# Patient Record
Sex: FEMALE | Race: WHITE | NOT HISPANIC OR LATINO | Employment: OTHER | URBAN - METROPOLITAN AREA
[De-identification: names, ages, dates, MRNs, and addresses within clinical notes are randomized per-mention and may not be internally consistent; named-entity substitution may affect disease eponyms.]

---

## 2023-08-25 PROBLEM — I95.1 ORTHOSTATIC HYPOTENSION: Status: ACTIVE | Noted: 2023-08-25

## 2023-08-25 PROBLEM — R07.89 ATYPICAL CHEST PAIN: Status: ACTIVE | Noted: 2023-08-25

## 2023-08-25 PROBLEM — I25.5 ISCHEMIC CARDIOMYOPATHY: Status: ACTIVE | Noted: 2023-08-25

## 2023-08-25 PROBLEM — E03.9 HYPOTHYROIDISM: Status: ACTIVE | Noted: 2023-08-25

## 2023-08-25 PROBLEM — I47.20 PAROXYSMAL VENTRICULAR TACHYCARDIA: Status: ACTIVE | Noted: 2023-08-25

## 2023-08-25 PROBLEM — I48.0 PAROXYSMAL ATRIAL FIBRILLATION (MULTI): Status: ACTIVE | Noted: 2023-08-25

## 2023-08-25 PROBLEM — R53.1 WEAKNESS: Status: ACTIVE | Noted: 2023-08-25

## 2023-08-25 PROBLEM — I47.29 PAROXYSMAL VENTRICULAR TACHYCARDIA (MULTI): Status: ACTIVE | Noted: 2023-08-25

## 2023-08-25 PROBLEM — R94.39 ABNORMAL NUCLEAR STRESS TEST: Status: ACTIVE | Noted: 2023-08-25

## 2023-08-25 PROBLEM — Z98.62 STATUS POST ANGIOPLASTY: Status: ACTIVE | Noted: 2023-08-25

## 2023-08-25 PROBLEM — I50.22 CHRONIC SYSTOLIC CHF (CONGESTIVE HEART FAILURE) (MULTI): Status: ACTIVE | Noted: 2023-08-25

## 2023-08-25 PROBLEM — E78.5 HYPERLIPIDEMIA: Status: ACTIVE | Noted: 2023-08-25

## 2023-08-25 PROBLEM — I44.7 LBBB (LEFT BUNDLE BRANCH BLOCK): Status: ACTIVE | Noted: 2023-08-25

## 2023-08-25 PROBLEM — Z79.899 HIGH RISK MEDICATION USE: Status: ACTIVE | Noted: 2023-08-25

## 2023-08-25 PROBLEM — E11.9 DIABETES MELLITUS (MULTI): Status: ACTIVE | Noted: 2023-08-25

## 2023-08-25 PROBLEM — I25.10 ASCVD (ARTERIOSCLEROTIC CARDIOVASCULAR DISEASE): Status: ACTIVE | Noted: 2023-08-25

## 2023-08-25 PROBLEM — Z95.810 BIVENTRICULAR ICD (IMPLANTABLE CARDIOVERTER-DEFIBRILLATOR) IN PLACE: Status: ACTIVE | Noted: 2023-08-25

## 2023-08-25 RX ORDER — INSULIN ASPART 100 [IU]/ML
INJECTION, SOLUTION INTRAVENOUS; SUBCUTANEOUS
COMMUNITY
End: 2023-10-04 | Stop reason: ALTCHOICE

## 2023-08-25 RX ORDER — INSULIN DETEMIR 100 [IU]/ML
INJECTION, SOLUTION SUBCUTANEOUS NIGHTLY
COMMUNITY

## 2023-08-25 RX ORDER — NAPROXEN SODIUM 220 MG/1
1 TABLET, FILM COATED ORAL DAILY
COMMUNITY
End: 2024-04-16 | Stop reason: SDUPTHER

## 2023-08-25 RX ORDER — MECLIZINE HCL 12.5 MG 12.5 MG/1
TABLET ORAL
COMMUNITY

## 2023-08-25 RX ORDER — LISINOPRIL 5 MG/1
1 TABLET ORAL DAILY
COMMUNITY
End: 2023-10-04 | Stop reason: ALTCHOICE

## 2023-08-25 RX ORDER — SPIRONOLACTONE 25 MG/1
1 TABLET ORAL DAILY
COMMUNITY
End: 2023-10-04 | Stop reason: ALTCHOICE

## 2023-08-25 RX ORDER — MIDODRINE HYDROCHLORIDE 2.5 MG/1
10 TABLET ORAL 3 TIMES DAILY
COMMUNITY
End: 2023-10-04 | Stop reason: ALTCHOICE

## 2023-08-25 RX ORDER — SERTRALINE HYDROCHLORIDE 50 MG/1
TABLET, FILM COATED ORAL
COMMUNITY
Start: 2023-04-14 | End: 2023-10-04 | Stop reason: ALTCHOICE

## 2023-08-25 RX ORDER — FAMOTIDINE 40 MG/1
40 TABLET, FILM COATED ORAL NIGHTLY
COMMUNITY
End: 2023-10-04 | Stop reason: ALTCHOICE

## 2023-08-25 RX ORDER — PANTOPRAZOLE SODIUM 40 MG/1
TABLET, DELAYED RELEASE ORAL
COMMUNITY

## 2023-08-25 RX ORDER — INSULIN LISPRO 100 [IU]/ML
INJECTION, SOLUTION INTRAVENOUS; SUBCUTANEOUS
COMMUNITY

## 2023-08-25 RX ORDER — METOCLOPRAMIDE 5 MG/1
1 TABLET ORAL 4 TIMES DAILY
COMMUNITY

## 2023-08-25 RX ORDER — OXYBUTYNIN CHLORIDE 5 MG/1
TABLET, EXTENDED RELEASE ORAL
COMMUNITY
Start: 2023-04-10 | End: 2023-10-04 | Stop reason: ALTCHOICE

## 2023-08-25 RX ORDER — NITROGLYCERIN 0.4 MG/1
TABLET SUBLINGUAL
COMMUNITY

## 2023-08-25 RX ORDER — LEVOTHYROXINE SODIUM 125 UG/1
1 TABLET ORAL DAILY
COMMUNITY
Start: 2022-08-26 | End: 2023-10-04 | Stop reason: ALTCHOICE

## 2023-08-25 RX ORDER — TRAZODONE HYDROCHLORIDE 50 MG/1
TABLET ORAL
COMMUNITY
Start: 2023-04-15 | End: 2024-03-22

## 2023-08-25 RX ORDER — ACETAMINOPHEN, DIPHENHYDRAMINE HCL, PHENYLEPHRINE HCL 325; 25; 5 MG/1; MG/1; MG/1
1 TABLET ORAL NIGHTLY
COMMUNITY
End: 2023-10-04 | Stop reason: ALTCHOICE

## 2023-08-25 RX ORDER — LANOLIN ALCOHOL/MO/W.PET/CERES
1 CREAM (GRAM) TOPICAL DAILY
COMMUNITY

## 2023-08-25 RX ORDER — LEVOTHYROXINE SODIUM 100 UG/1
1 TABLET ORAL DAILY
COMMUNITY
End: 2023-10-04 | Stop reason: ALTCHOICE

## 2023-08-25 RX ORDER — ASPIRIN 81 MG/1
1 TABLET ORAL DAILY
COMMUNITY

## 2023-08-25 RX ORDER — QUETIAPINE FUMARATE 25 MG/1
25 TABLET, FILM COATED ORAL NIGHTLY
COMMUNITY
End: 2023-10-04 | Stop reason: ALTCHOICE

## 2023-08-25 RX ORDER — ATORVASTATIN CALCIUM 20 MG/1
1 TABLET, FILM COATED ORAL NIGHTLY
COMMUNITY
Start: 2021-01-19 | End: 2023-12-05 | Stop reason: SDUPTHER

## 2023-08-25 RX ORDER — DOXYCYCLINE 100 MG/1
CAPSULE ORAL
COMMUNITY
Start: 2023-04-22 | End: 2023-10-04 | Stop reason: ALTCHOICE

## 2023-08-25 RX ORDER — METOPROLOL SUCCINATE 25 MG/1
25 TABLET, EXTENDED RELEASE ORAL DAILY
COMMUNITY
End: 2024-03-22

## 2023-08-25 RX ORDER — AMIODARONE HYDROCHLORIDE 200 MG/1
1 TABLET ORAL DAILY
COMMUNITY
End: 2023-11-20

## 2023-08-25 RX ORDER — INSULIN GLARGINE 100 [IU]/ML
INJECTION, SOLUTION SUBCUTANEOUS
COMMUNITY

## 2023-10-04 ENCOUNTER — OFFICE VISIT (OUTPATIENT)
Dept: CARDIOLOGY | Facility: CLINIC | Age: 83
End: 2023-10-04
Payer: MEDICARE

## 2023-10-04 VITALS
DIASTOLIC BLOOD PRESSURE: 68 MMHG | HEART RATE: 127 BPM | BODY MASS INDEX: 21.16 KG/M2 | SYSTOLIC BLOOD PRESSURE: 136 MMHG | HEIGHT: 65 IN | WEIGHT: 127 LBS

## 2023-10-04 DIAGNOSIS — I48.91 ATRIAL FIBRILLATION, UNSPECIFIED TYPE (MULTI): Primary | ICD-10-CM

## 2023-10-04 DIAGNOSIS — Z95.810 BIVENTRICULAR ICD (IMPLANTABLE CARDIOVERTER-DEFIBRILLATOR) IN PLACE: ICD-10-CM

## 2023-10-04 DIAGNOSIS — I25.5 ISCHEMIC CARDIOMYOPATHY: ICD-10-CM

## 2023-10-04 DIAGNOSIS — I50.22 CHRONIC SYSTOLIC CHF (CONGESTIVE HEART FAILURE) (MULTI): ICD-10-CM

## 2023-10-04 DIAGNOSIS — I95.1 ORTHOSTATIC HYPOTENSION: ICD-10-CM

## 2023-10-04 DIAGNOSIS — Z95.0 PACEMAKER: ICD-10-CM

## 2023-10-04 DIAGNOSIS — R55 SYNCOPE AND COLLAPSE: ICD-10-CM

## 2023-10-04 DIAGNOSIS — I47.29 PAROXYSMAL VENTRICULAR TACHYCARDIA (MULTI): ICD-10-CM

## 2023-10-04 DIAGNOSIS — Z79.899 HIGH RISK MEDICATION USE: ICD-10-CM

## 2023-10-04 DIAGNOSIS — R94.31 ABNORMAL EKG: ICD-10-CM

## 2023-10-04 PROCEDURE — 1159F MED LIST DOCD IN RCRD: CPT | Performed by: INTERNAL MEDICINE

## 2023-10-04 PROCEDURE — 93000 ELECTROCARDIOGRAM COMPLETE: CPT | Performed by: INTERNAL MEDICINE

## 2023-10-04 PROCEDURE — 99214 OFFICE O/P EST MOD 30 MIN: CPT | Performed by: INTERNAL MEDICINE

## 2023-10-04 PROCEDURE — 1036F TOBACCO NON-USER: CPT | Performed by: INTERNAL MEDICINE

## 2023-10-04 RX ORDER — LEVOTHYROXINE SODIUM 150 UG/1
150 TABLET ORAL
COMMUNITY
End: 2024-03-22

## 2023-10-04 RX ORDER — FLUDROCORTISONE ACETATE 0.1 MG/1
0.1 TABLET ORAL ONCE
Status: SHIPPED | OUTPATIENT
Start: 2023-10-04

## 2023-10-04 RX ORDER — SERTRALINE HYDROCHLORIDE 100 MG/1
100 TABLET, FILM COATED ORAL DAILY
COMMUNITY

## 2023-10-04 RX ORDER — MIDODRINE HYDROCHLORIDE 10 MG/1
10 TABLET ORAL 3 TIMES DAILY
COMMUNITY
End: 2024-03-22 | Stop reason: DRUGHIGH

## 2023-10-04 NOTE — PROGRESS NOTES
CARDIOLOGY OFFICE VISIT      CHIEF COMPLAINT  Chief Complaint   Patient presents with    Atrial Fibrillation     Patient presented today for a 1 year follow up.         HISTORY OF PRESENT ILLNESS  HPI     83-year-old  female who is followed for ischemic cardiomyopathy with a left ventricular ejection fraction of 30 to 35% per echo guided AV optimization dated November 26, 2019, New York Heart Association class II-III, stage C heart failure, coronary artery disease status post angioplasty with drug-eluting stent to the posterior descending artery and OMB on April 13, 2010 and failed attempts at angioplasty of the proximal LAD due to a small perforation on August 21, 2019. She underwent implantation of an AV biventricular ICD in November 16, 2005 with the most recent generator change on November 9, 2021 for KADEN parameters.     Since the last office visit, she has been doing well.  She denies any symptoms of chest pain or shortness of breath or palpitations.    Patient still using amiodarone 200 mg daily.  Patient states that she continues having episodes of postural dizziness.  No syncope.    PFTs in July 2023 DLCO reduced.  TSH in July 2023 5.37.    Patient had a device interrogation in August 2023 that shows battery longevity 2 years.  Otherwise adequate biventricular device system.    EKG performed today shows atrial and ventricular paced rhythm at rate of 127 bpm.  QRS duration 142 ms.  QT corrected 357 ms.  Rhythm strip shows the same pattern.            Past Medical History  Past Medical History:   Diagnosis Date    Essential (primary) hypertension 08/10/2022    Essential hypertension, benign    Personal history of other diseases of the circulatory system     History of hypotension       Social History  Social History     Tobacco Use    Smoking status: Never    Smokeless tobacco: Never   Substance Use Topics    Alcohol use: Not Currently    Drug use: Never       Family History     Family History    Problem Relation Name Age of Onset    Diabetes Mother          mellitus    Heart attack Father          Allergies:  Allergies   Allergen Reactions    Beta-Blockers (Beta-Adrenergic Blocking Agts) Other     Adverse reaction: Hypotension    Penicillins Hives    Sulfa (Sulfonamide Antibiotics) Nausea Only and Other        Outpatient Medications:  Current Outpatient Medications   Medication Instructions    amiodarone (Pacerone) 200 mg tablet 1 tablet, oral, Daily    aspirin 81 mg chewable tablet 1 tablet, oral, Daily    aspirin 81 mg EC tablet 1 tablet, oral, Daily    atorvastatin (Lipitor) 20 mg tablet 1 tablet, oral, Nightly    calcium polycarbophiL (FIBERCON) 625 mg, oral, Daily    cyanocobalamin (Vitamin B-12) 1,000 mcg tablet 1 tablet, oral, Daily    insulin detemir (Levemir FlexPen) 100 unit/mL (3 mL) pen subcutaneous, Nightly    insulin glargine (Lantus Solostar U-100 Insulin) 100 unit/mL (3 mL) pen subcutaneous    insulin lispro (HumaLOG KwikPen Insulin) 100 unit/mL injection subcutaneous    levothyroxine (SYNTHROID, LEVOXYL) 150 mcg, oral, Daily before breakfast    linaCLOtide (Linzess) 290 mcg capsule 1 capsule, oral, Daily    meclizine (Antivert) 12.5 mg tablet oral    metoclopramide (Reglan) 5 mg tablet 1 tablet, oral, 4 times daily    metoprolol succinate XL (TOPROL-XL) 25 mg, oral, Daily    midodrine (PROAMATINE) 10 mg, oral, 3 times daily    nitroglycerin (Nitrostat) 0.4 mg SL tablet sublingual    pantoprazole (ProtoNix) 40 mg EC tablet oral    sertraline (ZOLOFT) 100 mg, oral, Daily    traZODone (Desyrel) 50 mg tablet           REVIEW OF SYSTEMS  ROS      VITALS  Vitals:    10/04/23 1437   BP: 136/68   Pulse: (!) 127       PHYSICAL EXAM  Physical Exam    PECOMP    PHYSICAL EXAMINATION:  GENERAL APPEARANCE: Well developed, well nourished, in no acute distress.  CHEST: Symmetric and non-tender.  INTEGUMENT: Skin warm and dry, without gross excoriationis or lesions.  HEENT: No gross abnormalities of  conjunctiva, teeth, gums, oral mucosa  NECK: Supple, no JVD, no bruit. Thyroid not palpable. Carotid upstrokes normal.  NEURO/PSHCY: Alert and oriented x3; appropriate behavior and responses and responses, grossly normal cerebellar function with normal balance and coordination  LUNGS: Clear to auscultation bilaterally; normal respiratory effort.  HEART: Rate and rhythm regular with no evident murmur; no gallop appreciated. There are no rubs, clicks or heaves. PMI nondisplaced.  Device in the left pectoral healing well.  No signs of hematoma or infection.  ABDOMEN: Soft, nontender, no palpable hepatosplenomegaly, no mases, no bruits. Abdominal aorta not noted to be enlarged.  MUSCULOSKELETAL: Ambulatory with normal tandem gait.  EXTREMITIES: Warm with good color, no clubbing or cyanois. There is no edema noted.  PERIPHERAL VASCULAR: Pulses present and equally palpable; 2+ throughout. No femoral bruits.    ASSESSMENT AND PLAN  Clinical impressions:  1. Ischemic cardiomyopathy with a left ventricular ejection fraction once depressed to 20%, improved to 30 to 35% per echo guided AV optimization dated November 26, 2019, New York Heart Association class II, stage C heart failure at today's office visit.  2. Coronary artery disease status post angioplasty with drug-eluting stent to the PDA and OMB on April 13, 2010 and failed attempt at PTCA of the proximal LAD due to a small perforation per left heart catheterization dated August 21, 2019.  3. AV biventricular ICD implant on November 16, 2005 with the most recent generator change out on November 9, 2021 for KADEN parameters ([place] unify Assura).  4. Riotta lead on field alert and functioning normally.  5. Ventricular tachycardia controlled on amiodarone..  6. Hypotension on midodrine.  7. Dyslipidemia on statin.  8. Diabetes mellitus.  9. Hypothyroidism on replacement therapy.  10. Anxiety disorder.  11. BMI of 22.31.      Plan recommendations    Patient continues  having episodes of postural dizziness-hypotension.  We will prescribe Florinef 0.1 mg 1 tablet daily.    Continue with midodrine therapy.    Follow device clinic as scheduled.    Continue with high risk medication (amiodarone).    Get CMP and TSH every 6 months for heart risk medication.    Get complete PFTs annually for high risk medication.    Follow my office every 6 months or sooner needed.    Risk factor modification and lifestyle modification discussed with patient. Diet , exercise and hydration discussed with patient.    I have personally review with patient during this office visit, laboratory data, echocardiogram results, stress test results, Holter-event monitor results prior and after the last electrophysiology visit. All questions has been answered.    Please excuse any errors in grammar or translation related to this dictation.  Voice recognition software was utilized to prepare this document.

## 2023-10-05 ENCOUNTER — DOCUMENTATION (OUTPATIENT)
Dept: CARDIOLOGY | Facility: CLINIC | Age: 83
End: 2023-10-05
Payer: MEDICARE

## 2023-10-05 NOTE — PROGRESS NOTES
10/5/23  Pt's granddaughter left vm new order for Imaninef did not go to pharmacy yesterday from Dr. Garcia.  Re-sent pending.  Ivana

## 2023-10-05 NOTE — PROGRESS NOTES
10/5/23  Pt's granddaughter left vm Fludrocortisone was not sent to pharmacy at  with Dr. Garcia yesterday.  Per chart review, this was recorded accidentally as a one time, in clinic administered dose.  Reviewed note with Joann in suite in Dr. Garcia's absence.  OK to call in verbal order.  Spoke with Sarai at pharmacy to give order.  She read back correct order to confirm.  Ivana

## 2023-11-19 DIAGNOSIS — I48.0 PAROXYSMAL ATRIAL FIBRILLATION (MULTI): ICD-10-CM

## 2023-11-20 RX ORDER — AMIODARONE HYDROCHLORIDE 200 MG/1
200 TABLET ORAL DAILY
Qty: 90 TABLET | Refills: 3 | Status: SHIPPED | OUTPATIENT
Start: 2023-11-20

## 2023-12-05 ENCOUNTER — TELEPHONE (OUTPATIENT)
Dept: CARDIOLOGY | Facility: CLINIC | Age: 83
End: 2023-12-05
Payer: MEDICARE

## 2023-12-05 DIAGNOSIS — E78.2 MIXED HYPERLIPIDEMIA: Primary | ICD-10-CM

## 2023-12-05 RX ORDER — ATORVASTATIN CALCIUM 20 MG/1
20 TABLET, FILM COATED ORAL NIGHTLY
Qty: 90 TABLET | Refills: 3 | Status: SHIPPED | OUTPATIENT
Start: 2023-12-05

## 2023-12-05 NOTE — TELEPHONE ENCOUNTER
Pharmacy requesting refill of atorvastatin 20mg daily. Pt has a follow up scheduled. Order sent to physician for signature.   
No

## 2023-12-28 ENCOUNTER — TELEPHONE (OUTPATIENT)
Dept: CARDIOLOGY | Facility: CLINIC | Age: 83
End: 2023-12-28
Payer: MEDICARE

## 2023-12-28 DIAGNOSIS — Z79.899 HIGH RISK MEDICATION USE: ICD-10-CM

## 2023-12-28 DIAGNOSIS — I48.0 PAROXYSMAL ATRIAL FIBRILLATION (MULTI): ICD-10-CM

## 2023-12-28 NOTE — TELEPHONE ENCOUNTER
Amiodarone testing orders sent to [facility] [place] in 2/1/2024 @ 12 pm.  PFT paper order also completed.

## 2023-12-28 NOTE — LETTER
Ricarda Galeas     [address] [address]         Dear Ricarda Galeas     Enclosed you will find an order form to have your Pulmonary Functions Test done at [facility]. The specific time and date of your testing is indicated on the form.  It is also necessary for you to have a chest x-ray as well as lab work.  These tests are needed if you are on one of the following medications:  Cordarone, Pacerone, or Amiodarone.      ([place] [facility] [place] through the  [facility] entrance - NOT the Emergency Room entrance. )    If you are unable to keep the appointment that has been made for you, please contact our office at [phone] and press option #3 so that we may assist you in rescheduling.    Thank you for your compliance with this testing,      [facility]  [address] [address]  Phone: [phone]  Fax: [phone]    Note:  You MAY NOT USE inhalers for 4 hours PRIOR to your Pulmonary Function Test    [facility] [place]1/2024 at 12 pm

## 2024-03-21 ENCOUNTER — APPOINTMENT (OUTPATIENT)
Dept: CARDIOLOGY | Facility: CLINIC | Age: 84
End: 2024-03-21
Payer: MEDICARE

## 2024-03-22 ENCOUNTER — OFFICE VISIT (OUTPATIENT)
Dept: CARDIOLOGY | Facility: CLINIC | Age: 84
End: 2024-03-22
Payer: MEDICARE

## 2024-03-22 VITALS
BODY MASS INDEX: 21.39 KG/M2 | HEIGHT: 64 IN | HEART RATE: 70 BPM | DIASTOLIC BLOOD PRESSURE: 72 MMHG | WEIGHT: 125.3 LBS | SYSTOLIC BLOOD PRESSURE: 140 MMHG

## 2024-03-22 DIAGNOSIS — Z79.899 HIGH RISK MEDICATION USE: ICD-10-CM

## 2024-03-22 DIAGNOSIS — E11.9 INSULIN-REQUIRING OR DEPENDENT TYPE II DIABETES MELLITUS (MULTI): ICD-10-CM

## 2024-03-22 DIAGNOSIS — I25.5 CARDIOMYOPATHY, ISCHEMIC: ICD-10-CM

## 2024-03-22 DIAGNOSIS — I25.10 ASCVD (ARTERIOSCLEROTIC CARDIOVASCULAR DISEASE): Primary | ICD-10-CM

## 2024-03-22 DIAGNOSIS — I48.0 PAROXYSMAL ATRIAL FIBRILLATION (MULTI): ICD-10-CM

## 2024-03-22 DIAGNOSIS — T82.111A: ICD-10-CM

## 2024-03-22 DIAGNOSIS — I10 ESSENTIAL HYPERTENSION: ICD-10-CM

## 2024-03-22 DIAGNOSIS — Z95.820 STATUS POST ANGIOPLASTY WITH STENT: ICD-10-CM

## 2024-03-22 DIAGNOSIS — I95.1 ORTHOSTATIC HYPOTENSION: ICD-10-CM

## 2024-03-22 DIAGNOSIS — E78.5 HYPERLIPIDEMIA, UNSPECIFIED HYPERLIPIDEMIA TYPE: ICD-10-CM

## 2024-03-22 DIAGNOSIS — Z78.9 NEVER SMOKED TOBACCO: ICD-10-CM

## 2024-03-22 DIAGNOSIS — Z79.4 INSULIN-REQUIRING OR DEPENDENT TYPE II DIABETES MELLITUS (MULTI): ICD-10-CM

## 2024-03-22 DIAGNOSIS — I47.29 PAROXYSMAL VENTRICULAR TACHYCARDIA (MULTI): ICD-10-CM

## 2024-03-22 PROCEDURE — 99214 OFFICE O/P EST MOD 30 MIN: CPT | Performed by: INTERNAL MEDICINE

## 2024-03-22 PROCEDURE — 3078F DIAST BP <80 MM HG: CPT | Performed by: INTERNAL MEDICINE

## 2024-03-22 PROCEDURE — 93000 ELECTROCARDIOGRAM COMPLETE: CPT | Performed by: INTERNAL MEDICINE

## 2024-03-22 PROCEDURE — 1036F TOBACCO NON-USER: CPT | Performed by: INTERNAL MEDICINE

## 2024-03-22 PROCEDURE — 3077F SYST BP >= 140 MM HG: CPT | Performed by: INTERNAL MEDICINE

## 2024-03-22 PROCEDURE — 1159F MED LIST DOCD IN RCRD: CPT | Performed by: INTERNAL MEDICINE

## 2024-03-22 RX ORDER — MIDODRINE HYDROCHLORIDE 5 MG/1
5 TABLET ORAL 3 TIMES DAILY
Qty: 90 TABLET | Refills: 0 | Status: SHIPPED | OUTPATIENT
Start: 2024-03-22 | End: 2024-04-16 | Stop reason: DRUGHIGH

## 2024-03-22 ASSESSMENT — ENCOUNTER SYMPTOMS: PALPITATIONS: 1

## 2024-03-22 NOTE — PATIENT INSTRUCTIONS
Please bring all medicines, vitamins, and herbal supplements with you when you come to the office.    Prescriptions will not be filled unless you are compliant with your follow up appointments or have a follow up appointment scheduled as per instruction of your physician. Refills should be requested at the time of your visit.    Reduce Midodrine to 5 mg one tablet 3 times daily  Lab work/amio lab also  B/p check 2-4 weeks  Follow up 6 months

## 2024-03-22 NOTE — PROGRESS NOTES
Subjective   Ricarda Galeas is a 83 y.o. female       Chief Complaint    Follow-up          HPI   Patient is in the office with her  and her daughter for follow-up for the problems noted below.  She has had no new complaints since her last visit and was seen once by electrophysiology October 2023.  She has chronic fatigue but denies any chest pain syncope or AICD discharge.  She has had no significant fluctuations in her weight.  Last visit I increased the midodrine up to 10 mg 3 times daily due to significant hypotension, currently she is hypertensive and has not been checking her blood pressure at home.  She has had no lab data since her last visit.  She had PFT and chest x-ray few weeks ago which I reviewed with her.  She seem to have no side effect of current medications    Assessment/recommendations:     1-coronary artery disease status post multivessel angioplasty in the past. Currently angina free and on maximally tolerated medical therapy. Attempted PCI of totally occluded LAD in 2019 failed  2-hyperlipidemia on statin therapy. Lipid profile is due and was ordered.  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  Presently she is hypertensive on maximal dose midodrine.  The dose will be reduced down to 5 mg 3 times daily and she will be back for blood pressure check.  5-History of hypothyroidism on replacement therapy.  Lab data were ordered  6-“status post biventricular AICD implantation , patient had battery change 2021, patient will continue to follow-up with electrophysiology Dr. Garcia at [facility]  7-ischemic cardiomyopathy. chronic systolic heart failure. She is stage C functional class II. Patient has not been able to tolerate standard heart failure therapy especially vasodilators due to hypotension caused by diabetic peripheral neuropathy. Last ejection fraction 30-35% 2019 echo  8-generalized fatigue and lack of stamina due to  "multiple factors including systolic heart failure   9-high risk medication with amiodarone . Amiodarone testing have been inconsequential,  10-paroxysmal ventricular tachycardia with no recurrences on amiodarone     Review of Systems   Cardiovascular:  Positive for palpitations.   All other systems reviewed and are negative.           Vitals:    03/22/24 0829   BP: 140/72   BP Location: Left arm   Patient Position: Sitting   Pulse: 70   Weight: 56.8 kg (125 lb 4.8 oz)   Height: 1.626 m (5' 4\")        Objective   Physical Exam  Constitutional:       Appearance: Normal appearance.   HENT:      Nose: Nose normal.   Neck:      Vascular: No carotid bruit.   Cardiovascular:      Rate and Rhythm: Normal rate.      Pulses: Normal pulses.      Heart sounds: Normal heart sounds.   Pulmonary:      Effort: Pulmonary effort is normal.   Abdominal:      General: Bowel sounds are normal.      Palpations: Abdomen is soft.   Musculoskeletal:         General: Normal range of motion.      Cervical back: Normal range of motion.      Right lower leg: No edema.      Left lower leg: No edema.   Skin:     General: Skin is warm and dry.   Neurological:      General: No focal deficit present.      Mental Status: She is alert.   Psychiatric:         Mood and Affect: Mood normal.         Behavior: Behavior normal.         Thought Content: Thought content normal.         Judgment: Judgment normal.         Allergies  Beta-blockers (beta-adrenergic blocking agts), Penicillins, and Sulfa (sulfonamide antibiotics)     Current Medications    Current Outpatient Medications:     amiodarone (Pacerone) 200 mg tablet, TAKE 1 TABLET BY MOUTH DAILY, Disp: 90 tablet, Rfl: 3    aspirin 81 mg chewable tablet, Chew 1 tablet (81 mg) once daily., Disp: , Rfl:     aspirin 81 mg EC tablet, Take 1 tablet (81 mg) by mouth once daily., Disp: , Rfl:     atorvastatin (Lipitor) 20 mg tablet, Take 1 tablet (20 mg) by mouth once daily at bedtime., Disp: 90 tablet, Rfl: " 3    calcium polycarbophiL (Fibercon) 625 mg tablet, Take 1 tablet (625 mg) by mouth once daily., Disp: , Rfl:     cyanocobalamin (Vitamin B-12) 1,000 mcg tablet, Take 1 tablet (1,000 mcg) by mouth once daily., Disp: , Rfl:     insulin detemir (Levemir FlexPen) 100 unit/mL (3 mL) pen, Inject under the skin once daily at bedtime., Disp: , Rfl:     insulin glargine (Lantus Solostar U-100 Insulin) 100 unit/mL (3 mL) pen, Inject under the skin., Disp: , Rfl:     insulin lispro (HumaLOG KwikPen Insulin) 100 unit/mL injection, Inject under the skin., Disp: , Rfl:     meclizine (Antivert) 12.5 mg tablet, Take by mouth., Disp: , Rfl:     metoclopramide (Reglan) 5 mg tablet, Take 1 tablet (5 mg) by mouth 4 times a day., Disp: , Rfl:     nitroglycerin (Nitrostat) 0.4 mg SL tablet, Place under the tongue., Disp: , Rfl:     pantoprazole (ProtoNix) 40 mg EC tablet, Take by mouth., Disp: , Rfl:     sertraline (Zoloft) 100 mg tablet, Take 1 tablet (100 mg) by mouth once daily., Disp: , Rfl:     midodrine (Proamatine) 5 mg tablet, Take 1 tablet (5 mg) by mouth 3 times a day., Disp: 90 tablet, Rfl: 0    Current Facility-Administered Medications:     fludrocortisone (Florinef) tablet 0.1 mg, 0.1 mg, oral, Once, Dieudonne Garcia MD                   EKG done in office today   Assessment/Plan   1. ASCVD (arteriosclerotic cardiovascular disease)  Follow Up In Cardiology      2. Essential hypertension  Follow Up In Cardiology      3. Paroxysmal atrial fibrillation (CMS/HCC)  ECG 12 Lead    Thyroid Stimulating Hormone    Thyroxine, Free    Triiodothyronine, Total      4. Paroxysmal ventricular tachycardia (CMS/HCC)  Basic Metabolic Panel    CBC      5. Cardiomyopathy, ischemic  Basic Metabolic Panel    CBC    Thyroid Stimulating Hormone      6. Biventricular AICD generator malfunction        7. High risk medication use  Basic Metabolic Panel    CBC    Thyroid Stimulating Hormone    Thyroxine, Free    Triiodothyronine, Total      8. Status  post angioplasty with stent        9. Orthostatic hypotension  midodrine (Proamatine) 5 mg tablet      10. BMI 21.0-21.9, adult        11. Never smoked tobacco        12. Hyperlipidemia, unspecified hyperlipidemia type  Aspartate Aminotransferase    Alanine Aminotransferase    Lipid Panel      13. Insulin-requiring or dependent type II diabetes mellitus (CMS/[facility])                 Scribe Attestation  By signing my name below, Imary Scribe   attest that this documentation has been prepared under the direction and in the presence of Harjit Gamboa MD.     Provider Attestation - Scribe documentation    All medical record entries made by the Scribe were at my direction and personally dictated by me. I have reviewed the chart and agree that the record accurately reflects my personal performance of the history, physical exam, discussion and plan.

## 2024-04-04 ENCOUNTER — APPOINTMENT (OUTPATIENT)
Dept: CARDIOLOGY | Facility: CLINIC | Age: 84
End: 2024-04-04
Payer: MEDICARE

## 2024-04-16 ENCOUNTER — OFFICE VISIT (OUTPATIENT)
Dept: CARDIOLOGY | Facility: CLINIC | Age: 84
End: 2024-04-16
Payer: MEDICARE

## 2024-04-16 VITALS
HEART RATE: 68 BPM | BODY MASS INDEX: 21.68 KG/M2 | HEIGHT: 64 IN | SYSTOLIC BLOOD PRESSURE: 134 MMHG | DIASTOLIC BLOOD PRESSURE: 62 MMHG | WEIGHT: 127 LBS

## 2024-04-16 DIAGNOSIS — I95.1 ORTHOSTATIC HYPOTENSION: ICD-10-CM

## 2024-04-16 DIAGNOSIS — I10 ESSENTIAL HYPERTENSION: ICD-10-CM

## 2024-04-16 PROCEDURE — 3075F SYST BP GE 130 - 139MM HG: CPT | Performed by: INTERNAL MEDICINE

## 2024-04-16 PROCEDURE — 1159F MED LIST DOCD IN RCRD: CPT | Performed by: INTERNAL MEDICINE

## 2024-04-16 PROCEDURE — 3078F DIAST BP <80 MM HG: CPT | Performed by: INTERNAL MEDICINE

## 2024-04-16 PROCEDURE — 99212 OFFICE O/P EST SF 10 MIN: CPT | Performed by: INTERNAL MEDICINE

## 2024-04-16 PROCEDURE — 1036F TOBACCO NON-USER: CPT | Performed by: INTERNAL MEDICINE

## 2024-04-16 RX ORDER — MIDODRINE HYDROCHLORIDE 2.5 MG/1
2.5 TABLET ORAL 3 TIMES DAILY
Qty: 270 TABLET | Refills: 3 | Status: SHIPPED | OUTPATIENT
Start: 2024-04-16 | End: 2025-04-16

## 2024-04-16 NOTE — PROGRESS NOTES
"Subjective   Ricarda Galeas is a 83 y.o. female       Chief Complaint    Follow-up; Hypertension          HPI   Patient is in the office for follow-up for the adjustment made on her midodrine for hypertension, the dose was reduced last visit down to 5 mg 3 times a day and she remains in the upper normal range for blood pressure reading and given her LV systolic dysfunction we need to reduce the dose down to 2.5 mg 3 times daily.  That is what we did and the patient will follow-up with me as scheduled.  She denies any dizziness or lightheadedness.  ROS         Vitals:    04/16/24 1131 04/16/24 1136   BP: 136/66 134/62   BP Location: Left arm Right arm   Patient Position: Sitting Sitting   Pulse: 68 68   Weight: 57.6 kg (127 lb)    Height: 1.626 m (5' 4\")         Objective   Physical Exam    Allergies  Beta-blockers (beta-adrenergic blocking agts), Penicillins, and Sulfa (sulfonamide antibiotics)     Current Medications    Current Outpatient Medications:     amiodarone (Pacerone) 200 mg tablet, TAKE 1 TABLET BY MOUTH DAILY, Disp: 90 tablet, Rfl: 3    aspirin 81 mg EC tablet, Take 1 tablet (81 mg) by mouth once daily., Disp: , Rfl:     atorvastatin (Lipitor) 20 mg tablet, Take 1 tablet (20 mg) by mouth once daily at bedtime., Disp: 90 tablet, Rfl: 3    calcium polycarbophiL (Fibercon) 625 mg tablet, Take 1 tablet (625 mg) by mouth once daily., Disp: , Rfl:     cyanocobalamin (Vitamin B-12) 1,000 mcg tablet, Take 1 tablet (1,000 mcg) by mouth once daily., Disp: , Rfl:     insulin detemir (Levemir FlexPen) 100 unit/mL (3 mL) pen, Inject under the skin once daily at bedtime., Disp: , Rfl:     insulin glargine (Lantus Solostar U-100 Insulin) 100 unit/mL (3 mL) pen, Inject under the skin., Disp: , Rfl:     insulin lispro (HumaLOG KwikPen Insulin) 100 unit/mL injection, Inject under the skin., Disp: , Rfl:     meclizine (Antivert) 12.5 mg tablet, Take by mouth., Disp: , Rfl:     metoclopramide (Reglan) 5 mg tablet, Take 1 " tablet (5 mg) by mouth 4 times a day., Disp: , Rfl:     nitroglycerin (Nitrostat) 0.4 mg SL tablet, Place under the tongue., Disp: , Rfl:     pantoprazole (ProtoNix) 40 mg EC tablet, Take by mouth., Disp: , Rfl:     sertraline (Zoloft) 100 mg tablet, Take 1 tablet (100 mg) by mouth once daily., Disp: , Rfl:     midodrine (Proamatine) 5 mg tablet, Take 1 tablet (5 mg) by mouth 3 times a day., Disp: 90 tablet, Rfl: 0    Current Facility-Administered Medications:     fludrocortisone (Florinef) tablet 0.1 mg, 0.1 mg, oral, Once, Dieudonne Garcia MD                     Assessment/Plan   1. Essential hypertension  Follow Up In Cardiology               Scribe Attestation  By signing my name below, Christi WALTERS LPN   , [place]   attest that this documentation has been prepared under the direction and in the presence of [facility] RN 1.     Provider Attestation - Scribe documentation    All medical record entries made by the Scribe were at my direction and personally dictated by me. I have reviewed the chart and agree that the record accurately reflects my personal performance of the history, physical exam, discussion and plan.

## 2024-05-16 PROCEDURE — 93284 PRGRMG EVAL IMPLANTABLE DFB: CPT | Performed by: INTERNAL MEDICINE

## 2024-10-01 ENCOUNTER — APPOINTMENT (OUTPATIENT)
Dept: CARDIOLOGY | Facility: CLINIC | Age: 84
End: 2024-10-01
Payer: MEDICARE

## 2024-10-01 VITALS
HEART RATE: 71 BPM | HEIGHT: 64 IN | BODY MASS INDEX: 20.66 KG/M2 | WEIGHT: 121 LBS | SYSTOLIC BLOOD PRESSURE: 120 MMHG | DIASTOLIC BLOOD PRESSURE: 76 MMHG

## 2024-10-01 DIAGNOSIS — I50.22 CHRONIC SYSTOLIC CHF (CONGESTIVE HEART FAILURE): ICD-10-CM

## 2024-10-01 DIAGNOSIS — Z95.810 BIVENTRICULAR ICD (IMPLANTABLE CARDIOVERTER-DEFIBRILLATOR) IN PLACE: ICD-10-CM

## 2024-10-01 DIAGNOSIS — I25.5 ISCHEMIC CARDIOMYOPATHY: ICD-10-CM

## 2024-10-01 DIAGNOSIS — I95.1 ORTHOSTATIC HYPOTENSION: ICD-10-CM

## 2024-10-01 DIAGNOSIS — Z78.9 NEVER SMOKED TOBACCO: ICD-10-CM

## 2024-10-01 DIAGNOSIS — E78.5 HYPERLIPIDEMIA, UNSPECIFIED HYPERLIPIDEMIA TYPE: ICD-10-CM

## 2024-10-01 DIAGNOSIS — Z79.899 HIGH RISK MEDICATION USE: ICD-10-CM

## 2024-10-01 DIAGNOSIS — Z91.81 AT RISK FOR FALLS: ICD-10-CM

## 2024-10-01 DIAGNOSIS — I47.29 PAROXYSMAL VENTRICULAR TACHYCARDIA (MULTI): ICD-10-CM

## 2024-10-01 DIAGNOSIS — I25.10 ASCVD (ARTERIOSCLEROTIC CARDIOVASCULAR DISEASE): Primary | ICD-10-CM

## 2024-10-01 DIAGNOSIS — I48.0 PAROXYSMAL ATRIAL FIBRILLATION (MULTI): ICD-10-CM

## 2024-10-01 PROCEDURE — 1036F TOBACCO NON-USER: CPT | Performed by: INTERNAL MEDICINE

## 2024-10-01 PROCEDURE — 99214 OFFICE O/P EST MOD 30 MIN: CPT | Performed by: INTERNAL MEDICINE

## 2024-10-01 PROCEDURE — 1159F MED LIST DOCD IN RCRD: CPT | Performed by: INTERNAL MEDICINE

## 2024-10-01 PROCEDURE — 93000 ELECTROCARDIOGRAM COMPLETE: CPT | Performed by: INTERNAL MEDICINE

## 2024-10-01 PROCEDURE — 3074F SYST BP LT 130 MM HG: CPT | Performed by: INTERNAL MEDICINE

## 2024-10-01 PROCEDURE — 3078F DIAST BP <80 MM HG: CPT | Performed by: INTERNAL MEDICINE

## 2024-10-01 RX ORDER — TRAZODONE HYDROCHLORIDE 50 MG/1
50 TABLET ORAL NIGHTLY
COMMUNITY

## 2024-10-01 RX ORDER — NITROGLYCERIN 0.4 MG/1
0.4 TABLET SUBLINGUAL EVERY 5 MIN PRN
Qty: 90 TABLET | Refills: 11 | Status: SHIPPED | OUTPATIENT
Start: 2024-10-01

## 2024-10-01 NOTE — PATIENT INSTRUCTIONS
Please bring all medicines, vitamins, and herbal supplements with you when you come to the office.    Prescriptions will not be filled unless you are compliant with your follow up appointments or have a follow up appointment scheduled as per instruction of your physician. Refills should be requested at the time of your visit.     Amio work up Feb  Follow up 6 months

## 2024-10-01 NOTE — PROGRESS NOTES
Subjective   Ricarda Galeas is a 84 y.o. female       Chief Complaint    Follow-up          HPI     Patient is in the office for follow-up accompanied by her daughter.  She lost her  Jimenez couple months ago with intracerebral bleed after a fall.  She denies any chest pain or syncope has tendency to fall and education that regard was provided.  Her AICD device interrogation revealed no arrhythmias.  She is on amiodarone but not anticoagulated.  Amiodarone testing has been in place.  She is very frail and her blood pressure is normal.  We have avoided vasodilators due to hypotension same story with the beta-blockers.  She has a small dose of midodrine to keep her blood pressure from dropping.    Assessment/recommendations:     1-coronary artery disease status post multivessel angioplasty in the past. Currently angina free and on maximally tolerated medical therapy. Attempted PCI of totally occluded LAD in 2019 failed  2-hyperlipidemia on statin therapy. Lipid profile is due and was ordered.  3-type II diabetes managed by PCP, controlled  4-diabetic autonomic dysfunction leading to orthostatic hypotension.  Presently she is on midodrine 2.5 mg 3 times daily with no further drop in the blood pressure  5-History of hypothyroidism on replacement therapy.  Lab data were ordered  6-status post biventricular AICD implantation , patient had battery change 2021, patient will continue to follow-up with electrophysiology Dr. Garcia at [facility]  7- ischemic cardiomyopathy with chronic systolic heart failure. She is stage C functional class II. Patient has not been able to tolerate standard heart failure therapy especially vasodilators due to hypotension caused by diabetic peripheral neuropathy. Last ejection fraction 30-35% 2019 echo  8-generalized fatigue and lack of stamina due to multiple factors including systolic heart failure   9-high risk medication with amiodarone . Amiodarone testing have been  "inconsequential,  10-paroxysmal ventricular tachycardia with no recurrences on amiodarone  11-recurrent falls due to autonomic dysfunction and hypotension, education to prevent falls provided  Review of Systems   All other systems reviewed and are negative.           Vitals:    10/01/24 1129   BP: 120/76   BP Location: Left arm   Patient Position: Sitting   Pulse: 71   Weight: 54.9 kg (121 lb)   Height: 1.626 m (5' 4\")        Objective   Physical Exam  Constitutional:       Appearance: Normal appearance.   HENT:      Nose: Nose normal.   Neck:      Vascular: No carotid bruit.   Cardiovascular:      Rate and Rhythm: Normal rate.      Pulses: Normal pulses.      Heart sounds: Normal heart sounds.   Pulmonary:      Effort: Pulmonary effort is normal.   Abdominal:      General: Bowel sounds are normal.      Palpations: Abdomen is soft.   Musculoskeletal:         General: Normal range of motion.      Cervical back: Normal range of motion.      Right lower leg: No edema.      Left lower leg: No edema.   Skin:     General: Skin is warm and dry.   Neurological:      General: No focal deficit present.      Mental Status: She is alert.   Psychiatric:         Mood and Affect: Mood normal.         Behavior: Behavior normal.         Thought Content: Thought content normal.         Judgment: Judgment normal.         Allergies  Penicillins and Sulfa (sulfonamide antibiotics)     Current Medications    Current Outpatient Medications:     amiodarone (Pacerone) 200 mg tablet, TAKE 1 TABLET BY MOUTH DAILY, Disp: 90 tablet, Rfl: 3    aspirin 81 mg EC tablet, Take 1 tablet (81 mg) by mouth once daily., Disp: , Rfl:     atorvastatin (Lipitor) 20 mg tablet, Take 1 tablet (20 mg) by mouth once daily at bedtime., Disp: 90 tablet, Rfl: 3    B cmplx 4/vit D3/C/folic/zinc (VITAL-D RX ORAL), Take 1 tablet by mouth once daily., Disp: , Rfl:     cyanocobalamin (Vitamin B-12) 1,000 mcg tablet, Take 1 tablet (1,000 mcg) by mouth once daily., Disp: " , Rfl:     insulin detemir (Levemir FlexPen) 100 unit/mL (3 mL) pen, Inject under the skin once daily at bedtime., Disp: , Rfl:     insulin glargine (Lantus Solostar U-100 Insulin) 100 unit/mL (3 mL) pen, Inject under the skin., Disp: , Rfl:     insulin lispro (HumaLOG KwikPen Insulin) 100 unit/mL injection, Inject under the skin., Disp: , Rfl:     meclizine (Antivert) 12.5 mg tablet, Take by mouth., Disp: , Rfl:     metoclopramide (Reglan) 5 mg tablet, Take 1 tablet (5 mg) by mouth 4 times a day., Disp: , Rfl:     midodrine (Proamatine) 2.5 mg tablet, Take 1 tablet (2.5 mg) by mouth 3 times a day., Disp: 270 tablet, Rfl: 3    pantoprazole (ProtoNix) 40 mg EC tablet, Take by mouth., Disp: , Rfl:     sertraline (Zoloft) 100 mg tablet, Take 1 tablet (100 mg) by mouth once daily., Disp: , Rfl:     traZODone (Desyrel) 50 mg tablet, Take 1 tablet (50 mg) by mouth once daily at bedtime., Disp: , Rfl:     nitroglycerin (Nitrostat) 0.4 mg SL tablet, Place 1 tablet (0.4 mg) under the tongue every 5 minutes if needed for chest pain., Disp: 90 tablet, Rfl: 11  No current facility-administered medications for this visit.                     Assessment/Plan   1. ASCVD (arteriosclerotic cardiovascular disease)  Follow Up In Cardiology    Lipid Panel    Follow Up In Cardiology    nitroglycerin (Nitrostat) 0.4 mg SL tablet      2. Ischemic cardiomyopathy        3. Chronic systolic CHF (congestive heart failure)        4. Paroxysmal ventricular tachycardia (Multi)  ECG 12 Lead      5. Paroxysmal atrial fibrillation (Multi)  Thyroid Stimulating Hormone    Aspartate Aminotransferase    Basic Metabolic Panel    CBC    Alanine Aminotransferase    Complete Pulmonary Function Test (Spirometry/DLCO/Lung Volumes)    XR chest 2 views      6. Biventricular ICD (implantable cardioverter-defibrillator) in place        7. Orthostatic hypotension        8. Hyperlipidemia, unspecified hyperlipidemia type  Lipid Panel      9. High risk medication  use  Thyroid Stimulating Hormone    Aspartate Aminotransferase    Basic Metabolic Panel    CBC    Alanine Aminotransferase    Complete Pulmonary Function Test (Spirometry/DLCO/Lung Volumes)    XR chest 2 views      10. Never smoked tobacco        11. BMI 20.0-20.9, adult                 Scribe Attestation  By signing my name below, Christi WALTERS LPN, Scribe   attest that this documentation has been prepared under the direction and in the presence of Harjit Gamboa MD.     Provider Attestation - Scribe documentation    All medical record entries made by the Scribe were at my direction and personally dictated by me. I have reviewed the chart and agree that the record accurately reflects my personal performance of the history, physical exam, discussion and plan.

## 2024-10-22 LAB
NON-UH HIE ALANINE AMINOTRANSFERASE:CCNC:PT:SER/PLAS:QN:NO ADDITION OF P-5': 32 INT._UNIT/L (ref 6–46)
NON-UH HIE ANION GAP:SCNC:PT:SER/PLAS:QN:: 11 MEQ/L (ref 6–16)
NON-UH HIE ASPARTATE AMINOTRANSFERASE:CCNC:PT:SER/PLAS:QN:: 32 INT._UNIT/L (ref 5–43)
NON-UH HIE CALCIUM:MCNC:PT:SER/PLAS:QN:: 9.2 MG/DL (ref 8.9–11.1)
NON-UH HIE CARBON DIOXIDE:SCNC:PT:SER/PLAS:QN:: 27 MMOL/L (ref 21–31)
NON-UH HIE CHLORIDE:SCNC:PT:SER/PLAS:QN:: 103 MMOL/L (ref 101–111)
NON-UH HIE CHOLESTEROL.IN HDL:MCNC:PT:SER/PLAS:QN:: 47 MG/DL
NON-UH HIE CHOLESTEROL.IN LDL:MCNC:PT:SER/PLAS:QN:: 52 MG/DL
NON-UH HIE CHOLESTEROL.IN VLDL:MCNC:PT:SER/PLAS:QN:CALCULATED: 20 MG/DL (ref 7–40)
NON-UH HIE CHOLESTEROL:MCNC:PT:SER/PLAS:QN:: 119 MG/DL (ref 120–200)
NON-UH HIE CREATININE:MCNC:PT:SER/PLAS:QN:: 1.4 MG/DL (ref 0.5–1.3)
NON-UH HIE EGFR: 37 ML/MIN/1.73 M2
NON-UH HIE ERYTHROCYTE DISTRIBUTION WIDTH:RATIO:PT:RBC:QN:AUTOMATED COUNT: 17.1 % (ref 10.9–14.2)
NON-UH HIE ERYTHROCYTE MEAN CORPUSCULAR HEMOGLOBIN CONCENTRATION:MCNC:PT:RB: 32.6 GM/DL (ref 31.4–36)
NON-UH HIE ERYTHROCYTE MEAN CORPUSCULAR HEMOGLOBIN:ENTMASS:PT:RBC:QN:AUTOMA: 28.9 PG (ref 27–34)
NON-UH HIE ERYTHROCYTE MEAN CORPUSCULAR VOLUME:ENTVOL:PT:RBC:QN:AUTOMATED C: 88.4 FL (ref 80–100)
NON-UH HIE ERYTHROCYTE SIZE:MORPH:PT:BLD:NOM:: NORMAL
NON-UH HIE ERYTHROCYTES:NCNC:PT:BLD:QN:AUTOMATED COUNT: 4.3 E12/L (ref 4.3–5.9)
NON-UH HIE GLUCOSE:MCNC:PT:SER/PLAS:QN:: 95 MG/DL (ref 55–199)
NON-UH HIE HEMATOCRIT:VFR:PT:BLD:QN:AUTOMATED COUNT: 37.7 % (ref 34–46)
NON-UH HIE HEMOGLOBIN:MCNC:PT:BLD:QN:: 12.3 GM/DL (ref 12–16)
NON-UH HIE LEUKOCYTES: 7.1 E9/L (ref 4–11)
NON-UH HIE PLATELET MEAN VOLUME:ENTVOL:PT:BLD:QN:AUTOMATED COUNT: 7.9 FL (ref 6.4–10.8)
NON-UH HIE PLATELET: 233 E9/L (ref 150–500)
NON-UH HIE POTASSIUM:SCNC:PT:SER/PLAS:QN:: 3.9 MMOL/L (ref 3.5–5.3)
NON-UH HIE SODIUM:SCNC:PT:SER/PLAS:QN:: 137 MMOL/L (ref 135–145)
NON-UH HIE THYROTROPIN:ACNC:PT:SER/PLAS:QN:: 16.9 MCIU/ML (ref 0.34–5.6)
NON-UH HIE TRIGLYCERIDE:MCNC:PT:SER/PLAS:QN:: 99 MG/DL
NON-UH HIE UREA NITROGEN/CREATININE:MRTO:PT:SER/PLAS:QN:: 24 NO UNITS (ref 10–20)
NON-UH HIE UREA NITROGEN:MCNC:PT:SER/PLAS:QN:: 34 MG/DL (ref 5–21)

## 2024-10-23 ENCOUNTER — TELEPHONE (OUTPATIENT)
Dept: CARDIOLOGY | Facility: CLINIC | Age: 84
End: 2024-10-23
Payer: MEDICARE

## 2024-10-23 DIAGNOSIS — E03.9 HYPOTHYROIDISM, UNSPECIFIED TYPE: ICD-10-CM

## 2024-10-23 NOTE — TELEPHONE ENCOUNTER
----- Message from Harjit Gamboa sent at 10/23/2024  9:26 AM EDT -----  Patient has hypothyroidism, start Synthroid 50 mcg daily and check TSH in 6 weeks  ----- Message -----  From: Rolando Pimentel - Lab Results In  Sent: 10/22/2024   4:23 PM EDT  To: Harjit Gamboa MD

## 2024-10-23 NOTE — TELEPHONE ENCOUNTER
Result Communication    Resulted Orders   [facility] H[facility] CBC w/Indices   Result Value Ref Range    [facility] LEUKOCYTES 7.1 4.0 - 11.0 E9/L    [facility] ERYTHROCYTES:NCNC:PT:BLD:QN:AUTOMATED COUNT 4.3 4.3 - 5.9 E12/L    [facility] HEMOGLOBIN:MCNC:PT:BLD:QN: 12.3 12.0 - 16.0 gm/dL    [facility] H[facility] HEMATOCRIT:VFR:PT:BLD:QN:AUTOMATED COUNT 37.7 34.0 - 46.0 %    [facility] H[facility] ERYTHROCYTE DISTRIBUTION WIDTH:RATIO:PT:RBC:QN:AUTOMATED COUNT 17.1 (H) 10.9 - 14.2 %    [facility] H[facility] ERYTHROCYTE MEAN CORPUSCULAR HEMOGLOBIN:ENTMASS:PT:RBC:QN:AUTOMA 28.9 27.0 - 34.0 pg    [facility] H[facility] ERYTHROCYTE MEAN CORPUSCULAR HEMOGLOBIN CONCENTRATION:MCNC:PT:RB 32.6 31.4 - 36.0 gm/dL    [facility] H[facility] ERYTHROCYTE MEAN CORPUSCULAR VOLUME:ENTVOL:PT:RBC:QN:AUTOMATED C 88.4 80.0 - 100.0 fL    [facility] PLATELET MEAN VOLUME:ENTVOL:PT:BLD:QN:AUTOMATED COUNT 7.9 6.4 - 10.8 fL    [facility] PLATELET 233.0 150.0 - 500.0 E9/L    [facility] HIE ERYTHROCYTE SIZE:MORPH:PT:BLD:NOM: NORMAL    [facility] H[facility] BMP   Result Value Ref Range    [facility] GLUCOSE:MCNC:PT:SER/PLAS:QN: 95 55 - 199 mg/dL    [facility] HIE UREA NITROGEN:MCNC:PT:SER/PLAS:QN: 34 (H) 5 - 21 mg/dL    [facility] HIE CREATININE:MCNC:PT:SER/PLAS:QN: 1.4 (H) 0.5 - 1.3 mg/dL    [facility] HIE UREA NITROGEN/CREATININE:MRTO:PT:SER/PLAS:QN: 24 (H) 10 - 20 No Units    [facility] H[facility] CALCIUM:MCNC:PT:SER/PLAS:QN: 9.2 8.9 - 11.1 mg/dL    [facility] H[facility] SODIUM:SCNC:PT:SER/PLAS:QN: 137 135 - 145 mmol/L    [facility] H[facility] POTASSIUM:SCNC:PT:SER/PLAS:QN: 3.9 3.5 - 5.3 mmol/L    [facility] HIE CHLORIDE:SCNC:PT:SER/PLAS:QN: 103 101 - 111 mmol/L    [facility] HIE CARBON DIOXIDE:SCNC:PT:SER/PLAS:QN: 27 21 - 31 mmol/L    [facility] HIE ANION GAP:SCNC:PT:SER/PLAS:QN: 11 6 - 16 mEq/L   [facility] H[facility] ALT   Result Value Ref Range    [facility] HIE ALANINE AMINOTRANSFERASE:CCNC:PT:SER/PLAS:QN:NO ADDITION OF P-5' 32 6 - 46 Int._Unit/L   [facility] H[facility] AST   Result Value Ref Range    [facility] HIE ASPARTATE AMINOTRANSFERASE:CCNC:PT:SER/PLAS:QN: 32 5 - 43 Int._Unit/L   [facility] H[facility] Lipid  Panel   Result Value Ref Range    [facility] HIE CHOLESTEROL:MCNC:PT:SER/PLAS:QN: 119 (L) 120 - 200 mg/dL    [facility] HIE CHOLESTEROL.IN HDL:MCNC:PT:SER/PLAS:QN: 47 mg/dL      Comment:      '>= 60 LOW RISK''<= 40 HIGH RISK'    [facility] HIE CHOLESTEROL.IN LDL:MCNC:PT:SER/PLAS:QN: 52 <=129 mg/dL    [facility] HIE TRIGLYCERIDE:MCNC:PT:SER/PLAS:QN: 99 <=149 mg/dL    [facility] HIE CHOLESTEROL.IN VLDL:MCNC:PT:SER/PLAS:QN:CALCULATED 20 7 - 40 mg/dL   [facility] HIE eGFR   Result Value Ref Range    [facility] H[facility] EGFR 37 (L) >=59 mL/min/1.73 m2   [facility] H[facility] TSH   Result Value Ref Range    [facility] H[facility] THYROTROPIN:ACNC:PT:SER/PLAS:QN: 16.90 (H) 0.34 - 5.60 mcIU/mL

## 2024-10-24 RX ORDER — LEVOTHYROXINE SODIUM 150 UG/1
1 TABLET ORAL
COMMUNITY
Start: 2024-09-23

## 2024-10-24 NOTE — TELEPHONE ENCOUNTER
Spoke with patient's daughter, she states [place] [facility] [place]     Daughter reports patient is already taking levothyroxine 150 mcg daily.     Lab order prepped to be mailed to patient once signed.

## 2024-11-01 DIAGNOSIS — I48.0 PAROXYSMAL ATRIAL FIBRILLATION (MULTI): ICD-10-CM

## 2024-11-01 RX ORDER — AMIODARONE HYDROCHLORIDE 200 MG/1
200 TABLET ORAL DAILY
Qty: 90 TABLET | Refills: 3 | Status: SHIPPED | OUTPATIENT
Start: 2024-11-01

## 2024-11-06 ENCOUNTER — TELEPHONE (OUTPATIENT)
Dept: CARDIOLOGY | Facility: CLINIC | Age: 84
End: 2024-11-06
Payer: MEDICARE

## 2024-11-06 RX ORDER — SPIRONOLACTONE 25 MG/1
0.5 TABLET ORAL
COMMUNITY
Start: 2024-10-26

## 2024-11-06 RX ORDER — METOPROLOL SUCCINATE 25 MG/1
0.5 TABLET, EXTENDED RELEASE ORAL
COMMUNITY
Start: 2024-10-26

## 2024-11-06 RX ORDER — FUROSEMIDE 20 MG/1
1 TABLET ORAL
COMMUNITY
Start: 2024-10-26

## 2024-11-06 RX ORDER — ENALAPRIL MALEATE 5 MG/1
0.5 TABLET ORAL
COMMUNITY
Start: 2024-10-25

## 2024-11-06 NOTE — TELEPHONE ENCOUNTER
Daughter phones with concerns regarding what medication patient should be taking following recent discharge from [facility] at the end of October. She reports home health nurse has made changes so she wants to make sure patient is taking what ws prescribed. Daughter states she ha discharge paperwork at home that she will call with tomorrow to verify.     Will obtain discharge records to verify as well.    Daughter also reports patient is lethargic, has a decreased appetite, no chest pain, no SOB, slight swelling on right foot with a blood blister present that daughter states is the size of a quarter. She is concerned that medications could be causing these issues.

## 2024-11-06 NOTE — TELEPHONE ENCOUNTER
Discharge records reviewed patient's meds at discharge as follows:     Aspirin 81 mg daily  Enalapril 2.5 mg bid  Furosemide 20 mg bid  Levothyroxine 150 mcg daily  Metoprolol succinate 12.5 mg daily  Pantoprazole 40 mg daily  Spironolactone 12.5 mg daily  Zofran 4 mg q6 hr prn  Amiodarone 200 mg daily  Atorvastatin 20 mg daily  Insulin lispro 100 units/mL  Metoclopramide 5 mg daily  Vitamin d 3 50 mcg daily     Med list updated in chart.     Attempted to phone daughter to discuss medications. Left message requesting return call.

## 2024-11-08 RX ORDER — CHOLECALCIFEROL (VITAMIN D3) 50 MCG
2000 TABLET ORAL DAILY
COMMUNITY
Start: 2024-05-21

## 2024-11-08 RX ORDER — ONDANSETRON HYDROCHLORIDE 4 MG/5ML
4 SOLUTION ORAL 3 TIMES DAILY PRN
COMMUNITY
Start: 2024-03-26

## 2024-11-08 NOTE — TELEPHONE ENCOUNTER
Attempted to phone daughter Payton to discuss further concerns related to medications. Unable to reach at this time, left message requesting return call.

## 2025-03-20 DIAGNOSIS — E78.2 MIXED HYPERLIPIDEMIA: ICD-10-CM

## 2025-03-21 RX ORDER — ATORVASTATIN CALCIUM 20 MG/1
20 TABLET, FILM COATED ORAL NIGHTLY
Qty: 90 TABLET | Refills: 3 | Status: SHIPPED | OUTPATIENT
Start: 2025-03-21

## 2025-05-16 ENCOUNTER — APPOINTMENT (OUTPATIENT)
Dept: CARDIOLOGY | Facility: CLINIC | Age: 85
End: 2025-05-16
Payer: MEDICARE

## 2025-05-16 VITALS
DIASTOLIC BLOOD PRESSURE: 58 MMHG | SYSTOLIC BLOOD PRESSURE: 104 MMHG | HEIGHT: 64 IN | BODY MASS INDEX: 18.88 KG/M2 | WEIGHT: 110.6 LBS

## 2025-05-16 DIAGNOSIS — Z79.899 HIGH RISK MEDICATION USE: ICD-10-CM

## 2025-05-16 DIAGNOSIS — E78.5 HYPERLIPIDEMIA, UNSPECIFIED HYPERLIPIDEMIA TYPE: ICD-10-CM

## 2025-05-16 DIAGNOSIS — Z95.810 BIVENTRICULAR ICD (IMPLANTABLE CARDIOVERTER-DEFIBRILLATOR) IN PLACE: ICD-10-CM

## 2025-05-16 DIAGNOSIS — I25.5 ISCHEMIC CARDIOMYOPATHY: ICD-10-CM

## 2025-05-16 DIAGNOSIS — I95.1 ORTHOSTATIC HYPOTENSION: ICD-10-CM

## 2025-05-16 DIAGNOSIS — Z78.9 NEVER SMOKED TOBACCO: ICD-10-CM

## 2025-05-16 DIAGNOSIS — I25.10 ASCVD (ARTERIOSCLEROTIC CARDIOVASCULAR DISEASE): ICD-10-CM

## 2025-05-16 DIAGNOSIS — I50.22 CHRONIC SYSTOLIC HEART FAILURE: Primary | ICD-10-CM

## 2025-05-16 DIAGNOSIS — I48.0 PAROXYSMAL ATRIAL FIBRILLATION (MULTI): ICD-10-CM

## 2025-05-16 DIAGNOSIS — R29.6 RECURRENT FALLS: ICD-10-CM

## 2025-05-16 PROCEDURE — 99214 OFFICE O/P EST MOD 30 MIN: CPT | Performed by: INTERNAL MEDICINE

## 2025-05-16 PROCEDURE — 1159F MED LIST DOCD IN RCRD: CPT | Performed by: INTERNAL MEDICINE

## 2025-05-16 PROCEDURE — 3074F SYST BP LT 130 MM HG: CPT | Performed by: INTERNAL MEDICINE

## 2025-05-16 PROCEDURE — 93000 ELECTROCARDIOGRAM COMPLETE: CPT | Performed by: INTERNAL MEDICINE

## 2025-05-16 PROCEDURE — 3078F DIAST BP <80 MM HG: CPT | Performed by: INTERNAL MEDICINE

## 2025-05-16 PROCEDURE — 1036F TOBACCO NON-USER: CPT | Performed by: INTERNAL MEDICINE

## 2025-05-16 ASSESSMENT — ENCOUNTER SYMPTOMS: PALPITATIONS: 1

## 2025-05-16 NOTE — PROGRESS NOTES
HPI    Patient is in the office for follow-up for severe ischemic cardiomyopathy accompanied by her daughter.  She is very frail with tendency to fall at home.  She denies any orthopnea PND or lower extremity edema and no angina pectoris and no defibrillator discharge.  She is on amiodarone for ventricular tachycardia and her amiodarone testing have demonstrated no significant toxicity, her last TSH low back in October 2024 was around 60 which is significantly abnormal.  I do not have any follow-up testing but she reported to me having blood work earlier this week at [facility], a copy was requested, if her thyroid was not included it would be added.  Other labs were reviewed and they are unremarkable.  She remains frail as far as blood pressure is concerned therefore vasodilators for heart failure have not been established.  She remains on supportive therapy with midodrine.    Assessment/recommendations:     1-coronary artery disease status post multivessel angioplasty in the past. Currently angina free and on maximally tolerated medical therapy. Attempted PCI of totally occluded LAD in 2019 failed  2-hyperlipidemia on high intensity statin therapy.   3-type II diabetes managed by PCP, controlled  4-diabetic autonomic dysfunction leading to orthostatic hypotension.  Presently she is on midodrine 2.5 mg 3 times daily with no further drop in the blood pressure  5-History of hypothyroidism on replacement therapy.  Lab data from October 2024 revealed elevated TSH, follow-up testing earlier this week was requested and will be addressed with the becomes available  6-status post biventricular AICD implantation , patient had battery change 2021, ECG today demonstrated AV sequential biventricular rhythm  7- ischemic cardiomyopathy with chronic systolic heart failure. She is stage C functional class II. Patient has not been able to tolerate standard heart failure therapy especially vasodilators due to hypotension caused by  "diabetic peripheral neuropathy. Last ejection fraction 30-35% 2019 echo  8-generalized fatigue and lack of stamina due to multiple factors including systolic heart failure   9-high risk medication with amiodarone . Amiodarone testing have been inconsequential,  10-paroxysmal ventricular tachycardia with no recurrences on amiodarone  11-recurrent falls due to autonomic dysfunction and hypotension, education to prevent falls provided  Review of Systems   Cardiovascular:  Positive for palpitations.            Vitals:    05/16/25 1104   BP: 104/58   BP Location: Left arm   Patient Position: Sitting   Weight: 50.2 kg (110 lb 9.6 oz)   Height: 1.626 m (5' 4\")        EKG done in office today     Objective   Physical Exam  Constitutional:       Appearance: Normal appearance.   HENT:      Nose: Nose normal.   Neck:      Vascular: No carotid bruit.   Cardiovascular:      Rate and Rhythm: Normal rate.      Pulses: Normal pulses.      Heart sounds: Normal heart sounds.   Pulmonary:      Effort: Pulmonary effort is normal.   Abdominal:      General: Bowel sounds are normal.      Palpations: Abdomen is soft.   Musculoskeletal:         General: Normal range of motion.      Cervical back: Normal range of motion.      Right lower leg: No edema.      Left lower leg: No edema.   Skin:     General: Skin is warm and dry.   Neurological:      General: No focal deficit present.      Mental Status: She is alert.   Psychiatric:         Mood and Affect: Mood normal.         Behavior: Behavior normal.         Thought Content: Thought content normal.         Judgment: Judgment normal.         Allergies  Penicillins and Sulfa (sulfonamide antibiotics)     Current Medications  Current Outpatient Medications   Medication Instructions    amiodarone (PACERONE) 200 mg, oral, Daily    aspirin 81 mg EC tablet 1 tablet, Daily    atorvastatin (LIPITOR) 20 mg, oral, Nightly    cholecalciferol (VITAMIN D3) 2,000 Units, Daily    insulin lispro (HumaLOG " KwikPen Insulin) 100 unit/mL injection Inject under the skin.    levothyroxine (Synthroid, Levoxyl) 150 mcg tablet 1 tablet, Daily (0630)    metoclopramide (Reglan) 5 mg tablet 1 tablet, 4 times daily    metoprolol succinate XL (Toprol-XL) 25 mg 24 hr tablet 0.5 tablets, Daily (0630)    nitroglycerin (NITROSTAT) 0.4 mg, sublingual, Every 5 min PRN    ondansetron (ZOFRAN) 4 mg, 3 times daily PRN    pantoprazole (PROTONIX) 40 mg, Daily before breakfast    spironolactone (Aldactone) 25 mg tablet 0.5 tablets, Daily (0630)                        Assessment/Plan   1. Chronic systolic heart failure        2. ASCVD (arteriosclerotic cardiovascular disease)  Follow Up In Cardiology      3. Paroxysmal atrial fibrillation (Multi)  ECG 12 Lead    Aspartate Aminotransferase    Basic Metabolic Panel    Thyroid Stimulating Hormone    XR chest 2 views    Complete Pulmonary Function Test (Spirometry/DLCO/Lung Volumes)    Aspartate Aminotransferase    Basic Metabolic Panel    Thyroid Stimulating Hormone      4. High risk medication use  Aspartate Aminotransferase    Basic Metabolic Panel    Thyroid Stimulating Hormone    XR chest 2 views    Complete Pulmonary Function Test (Spirometry/DLCO/Lung Volumes)    Aspartate Aminotransferase    Basic Metabolic Panel    Thyroid Stimulating Hormone      5. Ischemic cardiomyopathy  Follow Up In Cardiology      6. Orthostatic hypotension        7. Hyperlipidemia, unspecified hyperlipidemia type        8. Biventricular ICD (implantable cardioverter-defibrillator) in place        9. Never smoked tobacco        10. BMI less than 19,adult        11. Recurrent falls                 Scribe Attestation  By signing my name below, Flores WALTERS LPN, Scribe   attest that this documentation has been prepared under the direction and in the presence of Harjit Gamboa MD.     Provider Attestation - Scribe documentation    All medical record entries made by the Scribe were at my direction and personally  dictated by me. I have reviewed the chart and agree that the record accurately reflects my personal performance of the history, physical exam, discussion and plan.    Strong peripheral pulses/Capillary refill less/equal to 2 seconds

## 2025-05-16 NOTE — PATIENT INSTRUCTIONS
Please bring all medicines, vitamins, and herbal supplements with you when you come to the office.    Prescriptions will not be filled unless you are compliant with your follow up appointments or have a follow up appointment scheduled as per instruction of your physician. Refills should be requested at the time of your visit.     Fall Prevention Education Given   BMI normal

## 2025-05-16 NOTE — LETTER
May 16, 2025     Jannette Serrano DO  [address] [facility]  [address] [facility] [address]    Patient: Rciarda Galeas   YOB: 1940   Date of Visit: 5/16/2025       Dear Dr. Jannette Serrano DO:    Thank you for referring Ricarda Galeas to me for evaluation. Below are my notes for this consultation.  If you have questions, please do not hesitate to call me. I look forward to following your patient along with you.       Sincerely,     Harjit Gamboa MD      CC: No Recipients  ______________________________________________________________________________________    HPI    Patient is in the office for follow-up for severe ischemic cardiomyopathy accompanied by her daughter.  She is very frail with tendency to fall at home.  She denies any orthopnea PND or lower extremity edema and no angina pectoris and no defibrillator discharge.  She is on amiodarone for ventricular tachycardia and her amiodarone testing have demonstrated no significant toxicity, her last TSH low back in October 2024 was around 60 which is significantly abnormal.  I do not have any follow-up testing but she reported to me having blood work earlier this week at [facility], a copy was requested, if her thyroid was not included it would be added.  Other labs were reviewed and they are unremarkable.  She remains frail as far as blood pressure is concerned therefore vasodilators for heart failure have not been established.  She remains on supportive therapy with midodrine.    Assessment/recommendations:     1-coronary artery disease status post multivessel angioplasty in the past. Currently angina free and on maximally tolerated medical therapy. Attempted PCI of totally occluded LAD in 2019 failed  2-hyperlipidemia on high intensity statin therapy.   3-type II diabetes managed by PCP, controlled  4-diabetic autonomic dysfunction leading to orthostatic hypotension.  Presently she is on midodrine 2.5 mg 3 times daily with no further drop in the  "blood pressure  5-History of hypothyroidism on replacement therapy.  Lab data from October 2024 revealed elevated TSH, follow-up testing earlier this week was requested and will be addressed with the becomes available  6-status post biventricular AICD implantation , patient had battery change 2021, ECG today demonstrated AV sequential biventricular rhythm  7- ischemic cardiomyopathy with chronic systolic heart failure. She is stage C functional class II. Patient has not been able to tolerate standard heart failure therapy especially vasodilators due to hypotension caused by diabetic peripheral neuropathy. Last ejection fraction 30-35% 2019 echo  8-generalized fatigue and lack of stamina due to multiple factors including systolic heart failure   9-high risk medication with amiodarone . Amiodarone testing have been inconsequential,  10-paroxysmal ventricular tachycardia with no recurrences on amiodarone  11-recurrent falls due to autonomic dysfunction and hypotension, education to prevent falls provided  Review of Systems   Cardiovascular:  Positive for palpitations.            Vitals:    05/16/25 1104   BP: 104/58   BP Location: Left arm   Patient Position: Sitting   Weight: 50.2 kg (110 lb 9.6 oz)   Height: 1.626 m (5' 4\")        EKG done in office today     Objective   Physical Exam  Constitutional:       Appearance: Normal appearance.   HENT:      Nose: Nose normal.   Neck:      Vascular: No carotid bruit.   Cardiovascular:      Rate and Rhythm: Normal rate.      Pulses: Normal pulses.      Heart sounds: Normal heart sounds.   Pulmonary:      Effort: Pulmonary effort is normal.   Abdominal:      General: Bowel sounds are normal.      Palpations: Abdomen is soft.   Musculoskeletal:         General: Normal range of motion.      Cervical back: Normal range of motion.      Right lower leg: No edema.      Left lower leg: No edema.   Skin:     General: Skin is warm and dry.   Neurological:      General: No focal deficit " present.      Mental Status: She is alert.   Psychiatric:         Mood and Affect: Mood normal.         Behavior: Behavior normal.         Thought Content: Thought content normal.         Judgment: Judgment normal.         Allergies  Penicillins and Sulfa (sulfonamide antibiotics)     Current Medications  Current Outpatient Medications   Medication Instructions   • amiodarone (PACERONE) 200 mg, oral, Daily   • aspirin 81 mg EC tablet 1 tablet, Daily   • atorvastatin (LIPITOR) 20 mg, oral, Nightly   • cholecalciferol (VITAMIN D3) 2,000 Units, Daily   • insulin lispro (HumaLOG KwikPen Insulin) 100 unit/mL injection Inject under the skin.   • levothyroxine (Synthroid, Levoxyl) 150 mcg tablet 1 tablet, Daily (0630)   • metoclopramide (Reglan) 5 mg tablet 1 tablet, 4 times daily   • metoprolol succinate XL (Toprol-XL) 25 mg 24 hr tablet 0.5 tablets, Daily (0630)   • nitroglycerin (NITROSTAT) 0.4 mg, sublingual, Every 5 min PRN   • ondansetron (ZOFRAN) 4 mg, 3 times daily PRN   • pantoprazole (PROTONIX) 40 mg, Daily before breakfast   • spironolactone (Aldactone) 25 mg tablet 0.5 tablets, Daily (0630)                        Assessment/Plan   1. Chronic systolic heart failure        2. ASCVD (arteriosclerotic cardiovascular disease)  Follow Up In Cardiology      3. Paroxysmal atrial fibrillation (Multi)  ECG 12 Lead    Aspartate Aminotransferase    Basic Metabolic Panel    Thyroid Stimulating Hormone    XR chest 2 views    Complete Pulmonary Function Test (Spirometry/DLCO/Lung Volumes)    Aspartate Aminotransferase    Basic Metabolic Panel    Thyroid Stimulating Hormone      4. High risk medication use  Aspartate Aminotransferase    Basic Metabolic Panel    Thyroid Stimulating Hormone    XR chest 2 views    Complete Pulmonary Function Test (Spirometry/DLCO/Lung Volumes)    Aspartate Aminotransferase    Basic Metabolic Panel    Thyroid Stimulating Hormone      5. Ischemic cardiomyopathy  Follow Up In Cardiology      6.  Orthostatic hypotension        7. Hyperlipidemia, unspecified hyperlipidemia type        8. Biventricular ICD (implantable cardioverter-defibrillator) in place        9. Never smoked tobacco        10. BMI less than 19,adult        11. Recurrent falls                 Scribe Attestation  By signing my name below, I, Flores LE LPN  , Scribe   attest that this documentation has been prepared under the direction and in the presence of Harjit Gamboa MD.     Provider Attestation - Scribe documentation    All medical record entries made by the Scribe were at my direction and personally dictated by me. I have reviewed the chart and agree that the record accurately reflects my personal performance of the history, physical exam, discussion and plan.

## 2025-08-12 LAB
NON-UH HIE A/G RATIO: 1.4 (ref 1.1–2.2)
NON-UH HIE AGAP: 9 MEQ/L (ref 6–16)
NON-UH HIE ALBUMIN LVL: 3.7 GM/DL (ref 3.3–5)
NON-UH HIE ALK PHOS: 120 INT._UNIT/L (ref 21–98)
NON-UH HIE ALT: 18 INT._UNIT/L (ref 6–46)
NON-UH HIE AST: 28 INT._UNIT/L (ref 5–43)
NON-UH HIE BASOPHIL ABSOLUTE: 0.1 E9/L (ref 0–0.2)
NON-UH HIE BASOPHIL AUTO: 0.6 % (ref 0–2)
NON-UH HIE BILI TOTAL: 0.7 MG/DL (ref 0–1.1)
NON-UH HIE BUN/CREAT RATIO: 12 NO UNITS (ref 10–20)
NON-UH HIE BUN: 17 MG/DL (ref 5–21)
NON-UH HIE CALCIUM LVL: 9.2 MG/DL (ref 8.9–11.1)
NON-UH HIE CHLORIDE: 100 MMOL/L (ref 101–111)
NON-UH HIE CO2: 29 MMOL/L (ref 21–31)
NON-UH HIE CREATININE: 1.4 MG/DL (ref 0.5–1.3)
NON-UH HIE EGFR: 37 ML/MIN/1.73 M2
NON-UH HIE EOS ABSOLUTE: 0.2 E9/L (ref 0–0.5)
NON-UH HIE EOS AUTO: 2.4 % (ref 0–8)
NON-UH HIE GLOBULIN: 2.7 GM/DL (ref 1.4–4)
NON-UH HIE GLUCOSE LVL: 200 MG/DL (ref 55–199)
NON-UH HIE HCT: 35.1 % (ref 34–46)
NON-UH HIE HGB: 11.4 GM/DL (ref 12–16)
NON-UH HIE INR: 1.13
NON-UH HIE LACTIC ACID LVL: 2.2 MMOL/L (ref 0.5–2.2)
NON-UH HIE LYMPH ABSOLUTE: 1.2 E9/L (ref 1–4)
NON-UH HIE LYMPH AUTO: 15.9 % (ref 14–50)
NON-UH HIE MCH: 30 PG (ref 27–34)
NON-UH HIE MCHC: 32.4 GM/DL (ref 31.4–36)
NON-UH HIE MCV: 92.5 FL (ref 80–100)
NON-UH HIE MONO ABSOLUTE: 0.4 E9/L (ref 0.2–1)
NON-UH HIE MONO AUTO: 5.7 % (ref 4–14)
NON-UH HIE MPV: 8.1 FL (ref 6.4–10.8)
NON-UH HIE NEUTRO ABSOLUTE: 5.9 E9/L (ref 2–7.5)
NON-UH HIE NEUTRO AUTO: 75.4 % (ref 36–75)
NON-UH HIE PLATELET: 297 E9/L (ref 150–500)
NON-UH HIE POTASSIUM LVL: 4.1 MMOL/L (ref 3.5–5.3)
NON-UH HIE PT: 12.7 SECOND(S) (ref 9.4–12.5)
NON-UH HIE PTT: 27.5 SECOND(S) (ref 25.1–36.5)
NON-UH HIE RBC: 3.8 E12/L (ref 4.3–5.9)
NON-UH HIE RDW: 17.5 % (ref 10.9–14.2)
NON-UH HIE SODIUM LVL: 134 MMOL/L (ref 135–145)
NON-UH HIE TOTAL PROTEIN: 6.4 GM/DL (ref 6–7.8)
NON-UH HIE TROPONIN: 28.9 PG/ML (ref 10.1–27.1)
NON-UH HIE TROPONIN: 29.3 PG/ML (ref 10.1–27.1)
NON-UH HIE UA  BLOOD: NEGATIVE MG/DL
NON-UH HIE UA BILI: NEGATIVE MG/DL
NON-UH HIE UA CLARITY: CLEAR
NON-UH HIE UA COLOR: NORMAL
NON-UH HIE UA GLUCOSE: NEGATIVE MG/DL
NON-UH HIE UA KETONES: NEGATIVE MG/DL
NON-UH HIE UA LEUK EST: NORMAL CD:4673125267
NON-UH HIE UA MUCOUS: NEGATIVE CD:4673145661
NON-UH HIE UA NITRITE: NEGATIVE MG/DL
NON-UH HIE UA PH: 6 (ref 5–9)
NON-UH HIE UA PROTEIN: NEGATIVE MG/DL
NON-UH HIE UA RBC: NORMAL CD:4673145863 (ref 0–3)
NON-UH HIE UA SPEC DESC: NORMAL
NON-UH HIE UA SPEC GRAV: 1.01 (ref 1–1.03)
NON-UH HIE UA SQUAM EPITHELIAL: NORMAL CD:4673145863
NON-UH HIE UA UROBILINOGEN: NEGATIVE MG/DL
NON-UH HIE UA WBC: NORMAL CD:4673145863 (ref 0–5)
NON-UH HIE WBC: 7.8 E9/L (ref 4–11)

## 2025-12-26 ENCOUNTER — APPOINTMENT (OUTPATIENT)
Dept: CARDIOLOGY | Facility: CLINIC | Age: 85
End: 2025-12-26
Payer: MEDICARE